# Patient Record
Sex: FEMALE | URBAN - METROPOLITAN AREA
[De-identification: names, ages, dates, MRNs, and addresses within clinical notes are randomized per-mention and may not be internally consistent; named-entity substitution may affect disease eponyms.]

---

## 2020-12-03 ENCOUNTER — APPOINTMENT (RX ONLY)
Dept: URBAN - METROPOLITAN AREA CLINIC 55 | Facility: CLINIC | Age: 30
Setting detail: DERMATOLOGY
End: 2020-12-03

## 2020-12-03 DIAGNOSIS — Z41.9 ENCOUNTER FOR PROCEDURE FOR PURPOSES OTHER THAN REMEDYING HEALTH STATE, UNSPECIFIED: ICD-10-CM

## 2020-12-03 PROCEDURE — ? FRAXEL RESTORE DUAL

## 2020-12-03 ASSESSMENT — LOCATION ZONE DERM
LOCATION ZONE: LIP
LOCATION ZONE: FACE

## 2020-12-03 ASSESSMENT — LOCATION DETAILED DESCRIPTION DERM
LOCATION DETAILED: LEFT INFERIOR CENTRAL MALAR CHEEK
LOCATION DETAILED: SUPERIOR MID FOREHEAD
LOCATION DETAILED: RIGHT CENTRAL MALAR CHEEK
LOCATION DETAILED: PHILTRUM
LOCATION DETAILED: RIGHT CHIN

## 2020-12-03 ASSESSMENT — LOCATION SIMPLE DESCRIPTION DERM
LOCATION SIMPLE: SUPERIOR FOREHEAD
LOCATION SIMPLE: RIGHT CHEEK
LOCATION SIMPLE: LEFT CHEEK
LOCATION SIMPLE: UPPER LIP
LOCATION SIMPLE: CHIN

## 2020-12-03 NOTE — PROCEDURE: FRAXEL RESTORE DUAL
Length Topical Anesthesia Applied (Optional): 60 minutes
Post-Care Instructions: I reviewed with the patient in detail post-care instructions. Samples of BioCream, Journee, and Vaseline sent home with patient.
Detail Level: Zone
Eye Shielding Text (Leave Blank If Unwanted- Will Be Inserted If Selecting Eye Shields): The intraocular eye shields were placed. 2 drops of intraocular tetracaine HCL ophthalmic 0.5% solution was administered. The eye shields were coated with ophthalmic bacitracin prior to insertion. After the shields were removed the eyes were flushed with normal saline.
Passes (Optional): 8
Energy In Mj (Optional): 15
Pain Level (Optional): 1 (mild)
Treatment Number (Optional): 1
Topical Anesthesia?: 20% benzocaine, 8% lidocaine, 4% tetracaine
Actual Kj Used (Optional): 3.11
Consent: Written consent obtained, risks reviewed including but not limited to crusting, scabbing, blistering, scarring, darker or lighter pigmentary change, and/or incomplete removal.
Pre-Procedure Text: Pretreated with HQ 4% QD x4 weeks prior to appt. Valtrex 500 mg #10; started 1 day prior to procedure.
Energy In Mj (Optional): 20
Actual Kj Used (Optional): 0.22
Treatment Level (Optional): 7
Pain Level (Optional): 2 (moderate)

## 2020-12-15 ENCOUNTER — APPOINTMENT (RX ONLY)
Dept: URBAN - METROPOLITAN AREA CLINIC 55 | Facility: CLINIC | Age: 30
Setting detail: DERMATOLOGY
End: 2020-12-15

## 2020-12-15 DIAGNOSIS — Z41.9 ENCOUNTER FOR PROCEDURE FOR PURPOSES OTHER THAN REMEDYING HEALTH STATE, UNSPECIFIED: ICD-10-CM

## 2020-12-15 PROCEDURE — ? COSMETIC FOLLOW-UP

## 2020-12-15 PROCEDURE — ? PHOTO-DOCUMENTATION

## 2020-12-15 NOTE — PROCEDURE: PHOTO-DOCUMENTATION
Detail Level: Zone
Photo Preface (Leave Blank If You Do Not Want): Photographs were obtained today- in RX Photo

## 2020-12-15 NOTE — PROCEDURE: COSMETIC FOLLOW-UP
Comments (Free Text): RX photos obtained today. \\nPatient very pleased with her results. Reduction in pigment and severity of melasma is greatly improved. \\nWill repeat Fraxel 1550 to upper lip only for minor residual melasma.
Side Effects Or Complications: None
Patient Satisfaction: Very pleased
Treatment Override (Free Text): Fraxel
Global Improvement: Very Good
Detail Level: Zone

## 2021-01-22 ENCOUNTER — APPOINTMENT (RX ONLY)
Dept: URBAN - METROPOLITAN AREA CLINIC 55 | Facility: CLINIC | Age: 31
Setting detail: DERMATOLOGY
End: 2021-01-22

## 2021-01-22 DIAGNOSIS — Z41.9 ENCOUNTER FOR PROCEDURE FOR PURPOSES OTHER THAN REMEDYING HEALTH STATE, UNSPECIFIED: ICD-10-CM

## 2021-01-22 PROCEDURE — ? BOTOX

## 2021-01-22 PROCEDURE — ? FRAXEL RESTORE DUAL

## 2021-01-22 ASSESSMENT — LOCATION ZONE DERM
LOCATION ZONE: SCALP
LOCATION ZONE: LIP

## 2021-01-22 ASSESSMENT — LOCATION DETAILED DESCRIPTION DERM
LOCATION DETAILED: PHILTRUM
LOCATION DETAILED: LEFT CENTRAL OCCIPITAL SCALP

## 2021-01-22 ASSESSMENT — LOCATION SIMPLE DESCRIPTION DERM
LOCATION SIMPLE: SCALP
LOCATION SIMPLE: UPPER LIP

## 2021-01-22 NOTE — PROCEDURE: FRAXEL RESTORE DUAL
Pain Level (Optional): 2 (moderate)
Topical Anesthesia?: 20% benzocaine, 8% lidocaine, 4% tetracaine
Post-Care Instructions: I reviewed with the patient in detail post-care instructions. Samples of BioCream, Journee, and Vaseline sent home with patient.
Treatment Level (Optional): 4
Energy In Mj (Optional): 20
Eye Shielding Text (Leave Blank If Unwanted- Will Be Inserted If Selecting Eye Shields): The intraocular eye shields were placed. 2 drops of intraocular tetracaine HCL ophthalmic 0.5% solution was administered. The eye shields were coated with ophthalmic bacitracin prior to insertion. After the shields were removed the eyes were flushed with normal saline.
Pre-Procedure Text: Pretreated with HQ 4% QD x4 weeks prior to appt. Valtrex 500 mg #10; started 1 day prior to procedure.
Length Topical Anesthesia Applied (Optional): 60 minutes
Actual Kj Used (Optional): 0.26
Detail Level: Zone
Treatment Number (Optional): 2
Consent: Written consent obtained, risks reviewed including but not limited to crusting, scabbing, blistering, scarring, darker or lighter pigmentary change, and/or incomplete removal.
Passes (Optional): 8

## 2021-01-22 NOTE — PROCEDURE: BOTOX
Additional Area 5 Units: 0
Show Forehead Units: Yes
Glabellar Complex Units: 6
Dilution (U/0.1 Cc): 4
Additional Area 3 Location: Chin
Additional Area 2 Location: Upper lip
Show Lcl Units: No
Lot #: [id number]
Detail Level: Detailed
Consent: Written consent obtained. Risks include but not limited to lid/brow ptosis, bruising, swelling, diplopia, temporary effect, incomplete chemical denervation.
Expiration Date (Month Year): 9/30/2023
Post-Care Instructions: Patient instructed to not lie down for 4 hours and limit physical activity for 24 hours.
Additional Area 1 Location: Left Lateral Brow

## 2021-05-14 ENCOUNTER — APPOINTMENT (RX ONLY)
Dept: URBAN - METROPOLITAN AREA CLINIC 55 | Facility: CLINIC | Age: 31
Setting detail: DERMATOLOGY
End: 2021-05-14

## 2021-05-14 DIAGNOSIS — Z41.9 ENCOUNTER FOR PROCEDURE FOR PURPOSES OTHER THAN REMEDYING HEALTH STATE, UNSPECIFIED: ICD-10-CM

## 2021-05-14 PROCEDURE — ? HYDRAFACIAL

## 2021-05-14 ASSESSMENT — LOCATION SIMPLE DESCRIPTION DERM: LOCATION SIMPLE: INFERIOR FOREHEAD

## 2021-05-14 ASSESSMENT — LOCATION ZONE DERM: LOCATION ZONE: FACE

## 2021-05-14 ASSESSMENT — LOCATION DETAILED DESCRIPTION DERM: LOCATION DETAILED: INFERIOR MID FOREHEAD

## 2021-05-14 NOTE — PROCEDURE: HYDRAFACIAL
Glycolic Acid %: 7.5%
Vacuum Pressure: 10
Indication: anti-aging
Detail Level: Zone
Comments: Applied SkinBetter compact spf.
Additional Vacuum Pressure (Won't Render If 0): 0
Treatment Number: 2
Number Of Passes: 3
Consent: Written consent obtained, risks reviewed including but not limited to crusting, scabbing, blistering, scarring, darker or lighter pigmentary change, bruising, and/or incomplete response.
Post-Care Instructions: I reviewed with the patient in detail post-care instructions. Patient should stay away from the sun and wear sun protection until treated areas are fully healed. Applied Epidermal repair and Elta MD spf 46 to treated area. Patient given post Hy[employer] handout for a refresher.

## 2022-01-31 ENCOUNTER — RX ONLY (OUTPATIENT)
Age: 32
Setting detail: RX ONLY
End: 2022-01-31

## 2022-02-08 ENCOUNTER — APPOINTMENT (RX ONLY)
Dept: URBAN - METROPOLITAN AREA CLINIC 55 | Facility: CLINIC | Age: 32
Setting detail: DERMATOLOGY
End: 2022-02-08

## 2022-02-08 DIAGNOSIS — Z41.9 ENCOUNTER FOR PROCEDURE FOR PURPOSES OTHER THAN REMEDYING HEALTH STATE, UNSPECIFIED: ICD-10-CM

## 2022-02-08 PROCEDURE — ? MICRONEEDLING

## 2022-02-08 NOTE — PROCEDURE: MICRONEEDLING
Treatment Number (Optional): 1
Post-Care Instructions: After the procedure, take precautions agains sun exposure. Do not apply make-up for 12 hours after the procedure. Avoid alcohol based toners for 10-14 days. After 2-3 days patients can return to their regular skin regimen.
Topical Anesthesia?: 20% benzocaine, 8% lidocaine, 4% tetracaine
Depth In Mm (Location #3): 0.1
Detail Level: Zone
Consent: Verbal consent obtained, risks reviewed including but not limited to pain, scarring, infection and incomplete improvement.  Patient understands the procedure is cosmetic in nature and will require out of pocket payment.

## 2022-03-10 ENCOUNTER — APPOINTMENT (RX ONLY)
Dept: URBAN - METROPOLITAN AREA CLINIC 55 | Facility: CLINIC | Age: 32
Setting detail: DERMATOLOGY
End: 2022-03-10

## 2022-03-10 DIAGNOSIS — Z41.9 ENCOUNTER FOR PROCEDURE FOR PURPOSES OTHER THAN REMEDYING HEALTH STATE, UNSPECIFIED: ICD-10-CM

## 2022-03-10 PROCEDURE — ? MICRONEEDLING

## 2022-03-10 NOTE — PROCEDURE: MICRONEEDLING
Treatment Number (Optional): 2
Detail Level: Zone
Depth In Mm (Location #1): 0.1
Post-Care Instructions: After the procedure, take precautions agains sun exposure. Do not apply make-up for 12 hours after the procedure. Avoid alcohol based toners for 10-14 days. After 2-3 days patients can return to their regular skin regimen.
Topical Anesthesia?: 20% benzocaine, 8% lidocaine, 4% tetracaine
Consent: Verbal consent obtained, risks reviewed including but not limited to pain, scarring, infection and incomplete improvement.  Patient understands the procedure is cosmetic in nature and will require out of pocket payment.

## 2022-04-07 ENCOUNTER — RX ONLY (OUTPATIENT)
Age: 32
Setting detail: RX ONLY
End: 2022-04-07

## 2022-04-07 ENCOUNTER — APPOINTMENT (RX ONLY)
Dept: URBAN - METROPOLITAN AREA CLINIC 55 | Facility: CLINIC | Age: 32
Setting detail: DERMATOLOGY
End: 2022-04-07

## 2022-04-07 DIAGNOSIS — Z41.9 ENCOUNTER FOR PROCEDURE FOR PURPOSES OTHER THAN REMEDYING HEALTH STATE, UNSPECIFIED: ICD-10-CM

## 2022-04-07 PROCEDURE — ? MICRONEEDLING

## 2022-04-07 NOTE — PROCEDURE: MICRONEEDLING
Topical Anesthesia?: 20% benzocaine, 8% lidocaine, 4% tetracaine
Consent: Verbal consent obtained, risks reviewed including but not limited to pain, scarring, infection and incomplete improvement.  Patient understands the procedure is cosmetic in nature and will require out of pocket payment.
Treatment Number (Optional): 3
Depth In Mm (Location #4): 0.1
Post-Care Instructions: After the procedure, take precautions agains sun exposure. Do not apply make-up for 12 hours after the procedure. Avoid alcohol based toners for 10-14 days. After 2-3 days patients can return to their regular skin regimen.
Detail Level: Zone

## 2022-05-05 ENCOUNTER — APPOINTMENT (RX ONLY)
Dept: URBAN - METROPOLITAN AREA CLINIC 55 | Facility: CLINIC | Age: 32
Setting detail: DERMATOLOGY
End: 2022-05-05

## 2022-05-05 DIAGNOSIS — Z41.9 ENCOUNTER FOR PROCEDURE FOR PURPOSES OTHER THAN REMEDYING HEALTH STATE, UNSPECIFIED: ICD-10-CM

## 2022-05-05 PROCEDURE — ? MICRONEEDLING

## 2022-05-05 NOTE — PROCEDURE: MICRONEEDLING
Depth In Mm (Location #2): 0.1
Treatment Number (Optional): 0
Topical Anesthesia?: 20% benzocaine, 8% lidocaine, 4% tetracaine
Consent: Verbal consent obtained, risks reviewed including but not limited to pain, scarring, infection and incomplete improvement.  Patient understands the procedure is cosmetic in nature and will require out of pocket payment.
Post-Care Instructions: After the procedure, take precautions agains sun exposure. Do not apply make-up for 12 hours after the procedure. Avoid alcohol based toners for 10-14 days. After 2-3 days patients can return to their regular skin regimen.
Detail Level: Zone

## 2023-01-07 ENCOUNTER — APPOINTMENT (RX ONLY)
Dept: URBAN - METROPOLITAN AREA CLINIC 55 | Facility: CLINIC | Age: 33
Setting detail: DERMATOLOGY
End: 2023-01-07

## 2023-01-07 DIAGNOSIS — Z41.9 ENCOUNTER FOR PROCEDURE FOR PURPOSES OTHER THAN REMEDYING HEALTH STATE, UNSPECIFIED: ICD-10-CM

## 2023-01-07 PROCEDURE — ? INVENTORY

## 2023-01-07 PROCEDURE — ? BOTOX

## 2023-01-07 NOTE — PROCEDURE: BOTOX
Depressor Anguli Oris Units: 0
Show Right And Left Brow Units: No
Show Additional Area 3: Yes
Consent obtained. Risks include but not limited to lid/brow ptosis, bruising, swelling, diplopia, temporary effect, incomplete chemical denervation.
Detail Level: Detailed
Post-Care Instructions: Patient instructed to not lie down for 4 hours and limit physical activity for 24 hours.
Additional Area 2 Location: Nasalis
Forehead Units: 8
Glabellar Complex Units: 12
Show Inventory Tab: Hide
Additional Area 1 Location: Lip
Dilution (U/0.1 Cc): 4

## 2023-01-09 ENCOUNTER — APPOINTMENT (OUTPATIENT)
Age: 33
Setting detail: DERMATOLOGY
End: 2023-01-09

## 2023-01-09 DIAGNOSIS — L82.1 OTHER SEBORRHEIC KERATOSIS: ICD-10-CM

## 2023-01-09 DIAGNOSIS — L81.1 CHLOASMA: ICD-10-CM

## 2023-01-09 DIAGNOSIS — L81.4 OTHER MELANIN HYPERPIGMENTATION: ICD-10-CM

## 2023-01-09 DIAGNOSIS — D22 MELANOCYTIC NEVI: ICD-10-CM

## 2023-01-09 PROBLEM — D23.71 OTHER BENIGN NEOPLASM OF SKIN OF RIGHT LOWER LIMB, INCLUDING HIP: Status: ACTIVE | Noted: 2023-01-09

## 2023-01-09 PROBLEM — D22.5 MELANOCYTIC NEVI OF TRUNK: Status: ACTIVE | Noted: 2023-01-09

## 2023-01-09 PROCEDURE — 99213 OFFICE O/P EST LOW 20 MIN: CPT

## 2023-01-09 PROCEDURE — ? SUNSCREEN RECOMMENDATIONS

## 2023-01-09 PROCEDURE — ? PRESCRIPTION MEDICATION MANAGEMENT

## 2023-01-09 PROCEDURE — ? COUNSELING

## 2023-01-09 ASSESSMENT — LOCATION DETAILED DESCRIPTION DERM
LOCATION DETAILED: EPIGASTRIC SKIN
LOCATION DETAILED: RIGHT SUPERIOR MEDIAL MIDBACK
LOCATION DETAILED: PHILTRUM
LOCATION DETAILED: INFERIOR THORACIC SPINE

## 2023-01-09 ASSESSMENT — LOCATION ZONE DERM
LOCATION ZONE: TRUNK
LOCATION ZONE: LIP

## 2023-01-09 ASSESSMENT — LOCATION SIMPLE DESCRIPTION DERM
LOCATION SIMPLE: UPPER BACK
LOCATION SIMPLE: ABDOMEN
LOCATION SIMPLE: UPPER LIP
LOCATION SIMPLE: RIGHT LOWER BACK

## 2023-01-09 NOTE — PROCEDURE: PRESCRIPTION MEDICATION MANAGEMENT
Plan: Recommended otc LRP glycolic b5 dark spot corrector.
Render In Strict Bullet Format?: No
Detail Level: Simple

## 2023-05-02 ENCOUNTER — APPOINTMENT (RX ONLY)
Dept: URBAN - METROPOLITAN AREA CLINIC 55 | Facility: CLINIC | Age: 33
Setting detail: DERMATOLOGY
End: 2023-05-02

## 2023-05-02 DIAGNOSIS — Z41.9 ENCOUNTER FOR PROCEDURE FOR PURPOSES OTHER THAN REMEDYING HEALTH STATE, UNSPECIFIED: ICD-10-CM

## 2023-05-02 PROCEDURE — ? COSMETIC CONSULTATION: GENERAL

## 2023-05-02 PROCEDURE — ? DYSPORT

## 2023-05-02 PROCEDURE — ? INVENTORY

## 2023-06-05 ENCOUNTER — APPOINTMENT (OUTPATIENT)
Age: 33
Setting detail: DERMATOLOGY
End: 2023-06-05

## 2023-06-05 DIAGNOSIS — L82.0 INFLAMED SEBORRHEIC KERATOSIS: ICD-10-CM

## 2023-06-05 PROBLEM — D48.5 NEOPLASM OF UNCERTAIN BEHAVIOR OF SKIN: Status: ACTIVE | Noted: 2023-06-05

## 2023-06-05 PROCEDURE — ? COUNSELING

## 2023-06-05 PROCEDURE — ? LIQUID NITROGEN

## 2023-06-05 PROCEDURE — 17110 DESTRUCTION B9 LES UP TO 14: CPT

## 2023-06-05 ASSESSMENT — LOCATION SIMPLE DESCRIPTION DERM: LOCATION SIMPLE: RIGHT CHEEK

## 2023-06-05 ASSESSMENT — LOCATION ZONE DERM: LOCATION ZONE: FACE

## 2023-06-05 ASSESSMENT — LOCATION DETAILED DESCRIPTION DERM: LOCATION DETAILED: RIGHT SUPERIOR MEDIAL BUCCAL CHEEK

## 2023-06-05 NOTE — PROCEDURE: LIQUID NITROGEN
Show Applicator Variable?: Yes
Add 52 Modifier (Optional): no
Detail Level: Detailed
Medical Necessity Information: It is in your best interest to select a reason for this procedure from the list below. All of these items fulfill various CMS LCD requirements except the new and changing color options.
Spray Paint Text: The liquid nitrogen was applied to the skin utilizing a spray paint frosting technique.
Medical Necessity Clause: This procedure was medically necessary because the lesions that were treated were:
Post-Care Instructions: I reviewed with the patient in detail post-care instructions. Patient is to wear sunprotection, and avoid picking at any of the treated lesions. Pt may apply Vaseline to crusted or scabbing areas.
Number Of Freeze-Thaw Cycles: 1 freeze-thaw cycle
Consent: The patient's consent was obtained including but not limited to risks of crusting, scabbing, blistering, scarring, darker or lighter pigmentary change, recurrence, incomplete removal and infection.

## 2023-07-25 ENCOUNTER — APPOINTMENT (RX ONLY)
Dept: URBAN - METROPOLITAN AREA CLINIC 55 | Facility: CLINIC | Age: 33
Setting detail: DERMATOLOGY
End: 2023-07-25

## 2023-07-25 DIAGNOSIS — Z41.9 ENCOUNTER FOR PROCEDURE FOR PURPOSES OTHER THAN REMEDYING HEALTH STATE, UNSPECIFIED: ICD-10-CM

## 2023-07-25 PROCEDURE — ? DYSPORT

## 2023-07-25 PROCEDURE — ? INVENTORY

## 2023-07-25 NOTE — PROCEDURE: DYSPORT
Periorbital Skin Units: 0
Show Forehead Units: Yes
Show Lcl Units: No
Detail Level: Detailed
Dilution (U/0.1 Cc): 10
Consent obtained. Risks include but not limited to lid/brow ptosis, bruising, swelling, diplopia, temporary effect, incomplete chemical denervation.
Forehead Units: 20
Additional Area 1 Location: lip
Glabellar Complex Units: 30
Post-Care Instructions: Patient instructed to not lie down for 4 hours and limit physical activity for 24 hours.
Show Inventory Tab: Show

## 2023-08-02 ENCOUNTER — APPOINTMENT (RX ONLY)
Dept: URBAN - METROPOLITAN AREA CLINIC 55 | Facility: CLINIC | Age: 33
Setting detail: DERMATOLOGY
End: 2023-08-02

## 2023-08-02 DIAGNOSIS — Z41.9 ENCOUNTER FOR PROCEDURE FOR PURPOSES OTHER THAN REMEDYING HEALTH STATE, UNSPECIFIED: ICD-10-CM

## 2023-08-02 PROCEDURE — ? INVENTORY

## 2023-08-02 PROCEDURE — ? FILLERS

## 2023-08-02 NOTE — PROCEDURE: FILLERS
Include Documentation That Aspiration Was Performed Prior To Injecting Filler:: No
Nasolabial Folds Filler Volume In Cc: 0
Aspiration Statement: Aspiration was performed prior to injecting site with filler.
Additional Anesthesia Volume In Cc: 6
Consent: Written consent obtained. Risks include but not limited to bruising, beading, irregular texture, ulceration, infection, allergic reaction, scar formation, incomplete augmentation, temporary nature, and procedural pain.
Include Cannula Information In Note?: Yes
Post-Care Instructions: After the procedure, patient instructed to apply ice to reduce swelling.
Detail Level: Detailed
Include Cannula Size?: 27G
Filler: Restylane Kysse
Anesthesia Type: 1% lidocaine with epinephrine
Map Statment: See Attach Map for Complete Details
Anesthesia Volume In Cc: 0.5

## 2023-10-10 ENCOUNTER — APPOINTMENT (RX ONLY)
Dept: URBAN - METROPOLITAN AREA CLINIC 55 | Facility: CLINIC | Age: 33
Setting detail: DERMATOLOGY
End: 2023-10-10

## 2023-10-10 DIAGNOSIS — Z41.9 ENCOUNTER FOR PROCEDURE FOR PURPOSES OTHER THAN REMEDYING HEALTH STATE, UNSPECIFIED: ICD-10-CM

## 2023-10-10 PROCEDURE — ? INVENTORY

## 2023-10-10 PROCEDURE — ? BOTOX

## 2023-10-10 NOTE — PROCEDURE: BOTOX
Show Additional Area 1: Yes
Right Periorbital Skin Units: 0
Dilution (U/0.1 Cc): 4
Show Right And Left Pupillary Line Units: No
Incrementing Botox Units: By 0.5 Units
Additional Area 3 Location: gummy smile
Detail Level: Detailed
Forehead Units: 8
Show Inventory Tab: Hide
Glabellar Complex Units: 12
Additional Area 1 Location: Lip
Additional Area 2 Location: Nasalis
Consent obtained. Risks include but not limited to lid/brow ptosis, bruising, swelling, diplopia, temporary effect, incomplete chemical denervation.
Post-Care Instructions: Patient instructed to not lie down for 4 hours and limit physical activity for 24 hours.

## 2024-01-10 ENCOUNTER — APPOINTMENT (RX ONLY)
Dept: URBAN - METROPOLITAN AREA CLINIC 55 | Facility: CLINIC | Age: 34
Setting detail: DERMATOLOGY
End: 2024-01-10

## 2024-01-10 DIAGNOSIS — Z41.9 ENCOUNTER FOR PROCEDURE FOR PURPOSES OTHER THAN REMEDYING HEALTH STATE, UNSPECIFIED: ICD-10-CM

## 2024-01-10 PROCEDURE — ? BOTOX

## 2024-01-10 PROCEDURE — ? INVENTORY

## 2024-01-10 PROCEDURE — ? IN-HOUSE DISPENSING PHARMACY

## 2024-01-10 NOTE — PROCEDURE: IN-HOUSE DISPENSING PHARMACY
Product 23 Unit Type: mg
Product 44 Amount/Unit (Numbers Only): 0
Name Of Product 7: Lidocaine 23% / Tetracaine 7% Cream
Product 6 Unit Type: ml
Product 5 Amount/Unit (Numbers Only): 30
Name Of Product 5: Hydrating Tretinoin 0.025% Cream
Product 4 Refills: 2
Name Of Product 6: Rosacea Triple Combination Gel
Product 5 Refills: 11
Product 3 Application Directions: Apply nightly or as directed. Use SPF daily.
Name Of Product 4: Hydroquinone 8% Emulsion
Render Refills If Set To 0: Yes
Detail Level: Zone
Send Charges To Patient Encounter: No
Product 2 Application Directions: Apply nightly or as directed.
Name Of Product 1: Anti-Aging Brightening Cream
Name Of Product 3: Acne Triple Combination Gel
Name Of Product 2: Dapsone / Spironolactone Gel
Product 7 Application Directions: Apply only as directed
Product 7 Units Dispensed: 1

## 2024-01-10 NOTE — PROCEDURE: BOTOX
Show Right And Left Periorbital Units: No
L Brow Units: 0
Show Additional Area 5: Yes
Additional Area 2 Location: Nasalis
Periorbital Skin Units: 12
Dilution (U/0.1 Cc): 4
Show Inventory Tab: Hide
Consent obtained. Risks include but not limited to lid/brow ptosis, bruising, swelling, diplopia, temporary effect, incomplete chemical denervation.
Incrementing Botox Units: By 0.5 Units
Additional Area 1 Location: Lip
Post-Care Instructions: Patient instructed to not lie down for 4 hours and limit physical activity for 24 hours.
Forehead Units: 8
Glabellar Complex Units: 16
Additional Area 3 Location: gummy smile
Detail Level: Detailed

## 2024-04-24 ENCOUNTER — APPOINTMENT (OUTPATIENT)
Age: 34
Setting detail: DERMATOLOGY
End: 2024-04-24

## 2024-04-24 DIAGNOSIS — L81.4 OTHER MELANIN HYPERPIGMENTATION: ICD-10-CM

## 2024-04-24 DIAGNOSIS — H02.82 CYSTS OF EYELID: ICD-10-CM

## 2024-04-24 DIAGNOSIS — D22 MELANOCYTIC NEVI: ICD-10-CM

## 2024-04-24 DIAGNOSIS — L82.1 OTHER SEBORRHEIC KERATOSIS: ICD-10-CM

## 2024-04-24 PROBLEM — D23.71 OTHER BENIGN NEOPLASM OF SKIN OF RIGHT LOWER LIMB, INCLUDING HIP: Status: ACTIVE | Noted: 2024-04-24

## 2024-04-24 PROBLEM — D22.5 MELANOCYTIC NEVI OF TRUNK: Status: ACTIVE | Noted: 2024-04-24

## 2024-04-24 PROBLEM — D48.5 NEOPLASM OF UNCERTAIN BEHAVIOR OF SKIN: Status: ACTIVE | Noted: 2024-04-24

## 2024-04-24 PROBLEM — H02.829 CYSTS OF UNSPECIFIED EYE, UNSPECIFIED EYELID: Status: ACTIVE | Noted: 2024-04-24

## 2024-04-24 PROCEDURE — ? COSMETIC EXTRACTIONS

## 2024-04-24 PROCEDURE — ? SHAVE REMOVAL

## 2024-04-24 PROCEDURE — 11300 SHAVE SKIN LESION 0.5 CM/<: CPT

## 2024-04-24 PROCEDURE — ? SUNSCREEN RECOMMENDATIONS

## 2024-04-24 PROCEDURE — 99213 OFFICE O/P EST LOW 20 MIN: CPT | Mod: 25

## 2024-04-24 PROCEDURE — ? COUNSELING

## 2024-04-24 ASSESSMENT — LOCATION DETAILED DESCRIPTION DERM
LOCATION DETAILED: INFERIOR THORACIC SPINE
LOCATION DETAILED: LEFT CENTRAL EYEBROW
LOCATION DETAILED: EPIGASTRIC SKIN
LOCATION DETAILED: RIGHT AXILLARY VAULT
LOCATION DETAILED: RIGHT SUPERIOR MEDIAL MIDBACK

## 2024-04-24 ASSESSMENT — LOCATION SIMPLE DESCRIPTION DERM
LOCATION SIMPLE: ABDOMEN
LOCATION SIMPLE: RIGHT LOWER BACK
LOCATION SIMPLE: RIGHT AXILLARY VAULT
LOCATION SIMPLE: UPPER BACK
LOCATION SIMPLE: LEFT EYEBROW

## 2024-04-24 ASSESSMENT — LOCATION ZONE DERM
LOCATION ZONE: TRUNK
LOCATION ZONE: FACE
LOCATION ZONE: AXILLAE

## 2024-04-24 NOTE — HPI: EVALUATION OF SKIN LESION(S)
Hpi Title: Evaluation of Skin Lesions
Have Your Spot(S) Been Treated In The Past?: has not been treated
Family Member: Father & grandmother
Additional History: Patient here for full skin check, reports one mole in her right armpit that she thinks may be getting bigger and gets irritated when she shaves.

## 2024-04-24 NOTE — PROCEDURE: SHAVE REMOVAL
Size Of Lesion In Cm (Required): 0.5
Render Path Notes In Note?: No
Anesthesia Type: 1% lidocaine with epinephrine and a 1:10 solution of 8.4% sodium bicarbonate
Depth Of Shave: dermis
Was A Bandage Applied: Yes
Hemostasis: Drysol
Anesthesia Volume In Cc: 0.7
X Size Of Lesion In Cm (Optional): 0
Notification Instructions: Patient will be notified of pathology results. However, patient instructed to call the office if not contacted within 2 weeks.
Billing Type: Third-Party Bill
Detail Level: Detailed
Biopsy Method: double edge Personna blade
Consent was obtained from the patient. The risks and benefits to therapy were discussed in detail. Specifically, the risks of infection, scarring, bleeding, prolonged wound healing, incomplete removal, allergy to anesthesia, nerve injury and recurrence were addressed. Prior to the procedure, the treatment site was clearly identified and confirmed by the patient. All components of Universal Protocol/PAUSE Rule completed.
Post-Care Instructions: I reviewed with the patient in detail post-care instructions. Patient is to keep the biopsy site dry overnight, and then apply bacitracin twice daily until healed. Patient may apply hydrogen peroxide soaks to remove any crusting.
Wound Care: Mupirocin
Medical Necessity Information: It is in your best interest to select a reason for this procedure from the list below. All of these items fulfill various CMS LCD requirements except the new and changing color options.
Medical Necessity Clause: This procedure was medically necessary because the lesion that was treated was:

## 2024-04-24 NOTE — PROCEDURE: COSMETIC EXTRACTIONS
Detail Level: Detailed
Anesthesia Volume In Cc: 0
Price (Use Numbers Only, No Special Characters Or $): 25
Post-Care Instructions: I reviewed with the patient in detail post-care instructions. Patient is to wear sunprotection, and avoid picking at any of the treated lesions. Pt may apply Vaseline to crusted or scabbing areas.
Consent: The patient's consent was obtained including but not limited to risks of crusting, scabbing, blistering, scarring, darker or lighter pigmentary change, recurrence, incomplete removal and infection.
Render The Number Of Extractions: Yes

## 2024-06-25 ENCOUNTER — APPOINTMENT (RX ONLY)
Dept: URBAN - METROPOLITAN AREA CLINIC 55 | Facility: CLINIC | Age: 34
Setting detail: DERMATOLOGY
End: 2024-06-25

## 2024-06-25 DIAGNOSIS — Z41.9 ENCOUNTER FOR PROCEDURE FOR PURPOSES OTHER THAN REMEDYING HEALTH STATE, UNSPECIFIED: ICD-10-CM

## 2024-06-25 PROCEDURE — ? INVENTORY

## 2024-06-25 PROCEDURE — ? BOTOX

## 2024-06-25 PROCEDURE — ? COSMETIC CONSULTATION: GENERAL

## 2024-06-25 NOTE — PROCEDURE: BOTOX
R Brow Units: 0
Show Additional Area 4: Yes
Show Lcl Units: No
Additional Area 2 Location: Nasalis
Additional Area 1 Location: Lip
Incrementing Botox Units: By 0.5 Units
Detail Level: Detailed
Consent obtained. Risks include but not limited to lid/brow ptosis, bruising, swelling, diplopia, temporary effect, incomplete chemical denervation.
Forehead Units: 8
Show Inventory Tab: Hide
Dilution (U/0.1 Cc): 4
Additional Area 3 Location: gummy smile
Glabellar Complex Units: 16
Post-Care Instructions: Patient instructed to not lie down for 4 hours and limit physical activity for 24 hours.

## 2024-07-17 ENCOUNTER — APPOINTMENT (RX ONLY)
Dept: URBAN - METROPOLITAN AREA CLINIC 55 | Facility: CLINIC | Age: 34
Setting detail: DERMATOLOGY
End: 2024-07-17

## 2024-07-17 DIAGNOSIS — Z41.9 ENCOUNTER FOR PROCEDURE FOR PURPOSES OTHER THAN REMEDYING HEALTH STATE, UNSPECIFIED: ICD-10-CM

## 2024-07-17 PROCEDURE — ? INVENTORY

## 2024-07-17 PROCEDURE — ? PALOMAR VECTUS LASER HAIR REMOVAL

## 2024-07-17 NOTE — PROCEDURE: PALOMAR VECTUS LASER HAIR REMOVAL
# Of Treatments In Package: 0
Post-Care Instructions: I reviewed with the patient in detail post-care instructions. Patient should avoid the sun before and after treatment.
Post-Procedure Care: Immediate endpoint: perifollicular erythema and edema. Post care was reviewed with the patient and all patient questions were answered to their satisfaction.
Rate (Hz): Medium
Optic Size: 23 x 38mm
Render Post-Care In The Note: No
Pre-Procedure: Prior to proceeding the treatment areas were cleaned and all present put on their eye protection.
Laser Type: diode 810nm
Consent obtained, risks reviewed.
Detail Level: Detailed
Fluence In J/Cm2: 26/

## 2024-08-21 ENCOUNTER — APPOINTMENT (RX ONLY)
Dept: URBAN - METROPOLITAN AREA CLINIC 55 | Facility: CLINIC | Age: 34
Setting detail: DERMATOLOGY
End: 2024-08-21

## 2024-08-21 DIAGNOSIS — Z41.9 ENCOUNTER FOR PROCEDURE FOR PURPOSES OTHER THAN REMEDYING HEALTH STATE, UNSPECIFIED: ICD-10-CM

## 2024-08-21 PROCEDURE — ? INVENTORY

## 2024-08-21 PROCEDURE — ? PALOMAR VECTUS LASER HAIR REMOVAL

## 2024-08-21 NOTE — PROCEDURE: PALOMAR VECTUS LASER HAIR REMOVAL
Render Post-Care In The Note: No
Post-Procedure Care: Immediate endpoint: perifollicular erythema and edema. Post care was reviewed with the patient and all patient questions were answered to their satisfaction.
Laser Type: diode 810nm
Treatment Number: 0
Fluence In J/Cm2: 20/30
Rate (Hz): Medium
Consent obtained, risks reviewed.
Post-Care Instructions: I reviewed with the patient in detail post-care instructions. Patient should avoid the sun before and after treatment.
Detail Level: Detailed
Pre-Procedure: Prior to proceeding the treatment areas were cleaned and all present put on their eye protection.
Optic Size: 12 x 12mm

## 2024-10-02 ENCOUNTER — APPOINTMENT (RX ONLY)
Dept: URBAN - METROPOLITAN AREA CLINIC 55 | Facility: CLINIC | Age: 34
Setting detail: DERMATOLOGY
End: 2024-10-02

## 2024-10-02 DIAGNOSIS — Z41.9 ENCOUNTER FOR PROCEDURE FOR PURPOSES OTHER THAN REMEDYING HEALTH STATE, UNSPECIFIED: ICD-10-CM

## 2024-10-02 PROCEDURE — ? INVENTORY

## 2024-10-02 PROCEDURE — ? PALOMAR VECTUS LASER HAIR REMOVAL

## 2024-10-02 NOTE — PROCEDURE: PALOMAR VECTUS LASER HAIR REMOVAL
Rate (Hz): Medium
Post-Procedure Care: Immediate endpoint: perifollicular erythema and edema. Post care was reviewed with the patient and all patient questions were answered to their satisfaction.
Post-Care Instructions: I reviewed with the patient in detail post-care instructions. Patient should avoid the sun before and after treatment.
Laser Type: diode 810nm
Pre-Procedure: Prior to proceeding the treatment areas were cleaned and all present put on their eye protection.
Treatment Number: 0
Detail Level: Detailed
Fluence In J/Cm2: 14/22
Consent obtained, risks reviewed.
Optic Size: 12 x 12mm
Render Post-Care In The Note: No

## 2024-10-04 ENCOUNTER — APPOINTMENT (RX ONLY)
Dept: URBAN - METROPOLITAN AREA CLINIC 55 | Facility: CLINIC | Age: 34
Setting detail: DERMATOLOGY
End: 2024-10-04

## 2024-10-04 DIAGNOSIS — Z41.9 ENCOUNTER FOR PROCEDURE FOR PURPOSES OTHER THAN REMEDYING HEALTH STATE, UNSPECIFIED: ICD-10-CM

## 2024-10-04 PROCEDURE — ? BOTOX

## 2024-10-04 PROCEDURE — ? INVENTORY

## 2024-10-04 NOTE — PROCEDURE: BOTOX
Anterior Platysmal Bands Units: 0
Show Masseter Units: Yes
Show Ucl Units: No
Glabellar Complex Units: 16
Post-Care Instructions: Patient instructed to not lie down for 4 hours and limit physical activity for 24 hours.
Forehead Units: 8
Show Inventory Tab: Hide
Consent obtained. Risks include but not limited to lid/brow ptosis, bruising, swelling, diplopia, temporary effect, incomplete chemical denervation.
Additional Area 1 Location: Lip
Incrementing Botox Units: By 0.5 Units
Dilution (U/0.1 Cc): 4
Additional Area 2 Location: Nasalis
Detail Level: Detailed
Additional Area 3 Location: gummy smile

## 2024-11-13 ENCOUNTER — APPOINTMENT (RX ONLY)
Dept: URBAN - METROPOLITAN AREA CLINIC 55 | Facility: CLINIC | Age: 34
Setting detail: DERMATOLOGY
End: 2024-11-13

## 2024-11-13 DIAGNOSIS — Z41.9 ENCOUNTER FOR PROCEDURE FOR PURPOSES OTHER THAN REMEDYING HEALTH STATE, UNSPECIFIED: ICD-10-CM

## 2024-11-13 PROCEDURE — ? PALOMAR VECTUS LASER HAIR REMOVAL

## 2024-11-13 PROCEDURE — ? INVENTORY

## 2024-11-13 NOTE — PROCEDURE: PALOMAR VECTUS LASER HAIR REMOVAL
Post-Care Instructions: I reviewed with the patient in detail post-care instructions. Patient should avoid the sun before and after treatment.
Post-Procedure Care: Immediate endpoint: perifollicular erythema and edema. Post care was reviewed with the patient and all patient questions were answered to their satisfaction.
Optic Size: 12 x 12mm
Render Post-Care In The Note: No
Treatment Number: 0
Consent obtained, risks reviewed.
Laser Type: diode 810nm
Fluence In J/Cm2: 14/22
Detail Level: Detailed
Pre-Procedure: Prior to proceeding the treatment areas were cleaned and all present put on their eye protection.
Rate (Hz): Medium

## 2025-01-02 ENCOUNTER — APPOINTMENT (OUTPATIENT)
Dept: URBAN - METROPOLITAN AREA CLINIC 55 | Facility: CLINIC | Age: 35
Setting detail: DERMATOLOGY
End: 2025-01-02

## 2025-01-02 DIAGNOSIS — Z41.9 ENCOUNTER FOR PROCEDURE FOR PURPOSES OTHER THAN REMEDYING HEALTH STATE, UNSPECIFIED: ICD-10-CM

## 2025-01-02 PROCEDURE — ? INVENTORY

## 2025-01-02 PROCEDURE — ? PALOMAR VECTUS LASER HAIR REMOVAL

## 2025-01-02 NOTE — PROCEDURE: PALOMAR VECTUS LASER HAIR REMOVAL
Fluence In J/Cm2: 14/22
Post-Care Instructions: I reviewed with the patient in detail post-care instructions. Patient should avoid the sun before and after treatment.
Detail Level: Detailed
Optic Size: 12 x 12mm
External Cooling Fan Speed: 0
Pre-Procedure: Prior to proceeding the treatment areas were cleaned and all present put on their eye protection.
Consent obtained, risks reviewed.
Rate (Hz): Medium
Laser Type: diode 810nm
Render Post-Care In The Note: No
Post-Procedure Care: Immediate endpoint: perifollicular erythema and edema. Post care was reviewed with the patient and all patient questions were answered to their satisfaction.

## 2025-01-07 ENCOUNTER — APPOINTMENT (OUTPATIENT)
Dept: URBAN - METROPOLITAN AREA CLINIC 55 | Facility: CLINIC | Age: 35
Setting detail: DERMATOLOGY
End: 2025-01-07

## 2025-01-07 DIAGNOSIS — Z41.9 ENCOUNTER FOR PROCEDURE FOR PURPOSES OTHER THAN REMEDYING HEALTH STATE, UNSPECIFIED: ICD-10-CM

## 2025-01-07 PROCEDURE — ? BOTOX

## 2025-01-07 PROCEDURE — ? FILLERS

## 2025-01-07 PROCEDURE — ? INVENTORY

## 2025-01-07 NOTE — PROCEDURE: BOTOX
Additional Area 3 Units: 0
Show Lcl Units: No
Detail Level: Detailed
Show Additional Area 2: Yes
Additional Area 1 Location: Lip
Periorbital Skin Units: 12
Incrementing Botox Units: By 0.5 Units
Dilution (U/0.1 Cc): 4
Consent obtained. Risks include but not limited to lid/brow ptosis, bruising, swelling, diplopia, temporary effect, incomplete chemical denervation.
Show Inventory Tab: Hide
Post-Care Instructions: Patient instructed to not lie down for 4 hours and limit physical activity for 24 hours.
Additional Area 3 Location: gummy smile
Forehead Units: 8
Additional Area 2 Location: Nasalis
Glabellar Complex Units: 16

## 2025-01-07 NOTE — PROCEDURE: FILLERS
Vermilion Lips Filler Volume In Cc: 0
Map Statment: See Attach Map for Complete Details
Include Cannula Information In Note?: Yes
Filler: Restylane Kysse
Include Documentation That Aspiration Was Performed Prior To Injecting Filler:: No
Aspiration Statement: Aspiration was performed prior to injecting site with filler.
Anesthesia Type: 1% lidocaine with epinephrine
Include Cannula Size?: 27G
Anesthesia Volume In Cc: 0.5
Consent: Written consent obtained. Risks include but not limited to bruising, beading, irregular texture, ulceration, infection, allergic reaction, scar formation, incomplete augmentation, temporary nature, and procedural pain.
Detail Level: Detailed
Additional Anesthesia Volume In Cc: 6
Post-Care Instructions: After the procedure, patient instructed to apply ice to reduce swelling.
Include Cannula Size?: 25G

## 2025-01-16 ENCOUNTER — APPOINTMENT (OUTPATIENT)
Dept: URBAN - METROPOLITAN AREA CLINIC 55 | Facility: CLINIC | Age: 35
Setting detail: DERMATOLOGY
End: 2025-01-16

## 2025-03-04 ENCOUNTER — APPOINTMENT (OUTPATIENT)
Dept: URBAN - METROPOLITAN AREA CLINIC 55 | Facility: CLINIC | Age: 35
Setting detail: DERMATOLOGY
End: 2025-03-04

## 2025-03-04 DIAGNOSIS — Z41.9 ENCOUNTER FOR PROCEDURE FOR PURPOSES OTHER THAN REMEDYING HEALTH STATE, UNSPECIFIED: ICD-10-CM

## 2025-03-04 PROCEDURE — ? INVENTORY

## 2025-03-04 PROCEDURE — ? PALOMAR VECTUS LASER HAIR REMOVAL

## 2025-03-04 NOTE — PROCEDURE: PALOMAR VECTUS LASER HAIR REMOVAL
Post-Procedure Care: Immediate endpoint: perifollicular erythema and edema. Post care was reviewed with the patient and all patient questions were answered to their satisfaction.
External Cooling Fan Speed: 0
Render Post-Care In The Note: No
Consent obtained, risks reviewed.
Fluence In J/Cm2: 17/26
Rate (Hz): Medium
Pre-Procedure: Prior to proceeding the treatment areas were cleaned and all present put on their eye protection.
Optic Size: 12 x 12mm
Post-Care Instructions: I reviewed with the patient in detail post-care instructions. Patient should avoid the sun before and after treatment.
Laser Type: diode 810nm
Detail Level: Detailed

## 2025-03-13 ENCOUNTER — APPOINTMENT (OUTPATIENT)
Dept: URBAN - METROPOLITAN AREA CLINIC 55 | Facility: CLINIC | Age: 35
Setting detail: DERMATOLOGY
End: 2025-03-13

## 2025-03-13 DIAGNOSIS — Z41.9 ENCOUNTER FOR PROCEDURE FOR PURPOSES OTHER THAN REMEDYING HEALTH STATE, UNSPECIFIED: ICD-10-CM

## 2025-03-13 PROCEDURE — ? INVENTORY

## 2025-03-13 PROCEDURE — ? SIGNATURE HYDRAFACIAL

## 2025-03-13 ASSESSMENT — LOCATION ZONE DERM: LOCATION ZONE: FACE

## 2025-03-13 ASSESSMENT — LOCATION DETAILED DESCRIPTION DERM: LOCATION DETAILED: GLABELLA

## 2025-03-13 ASSESSMENT — LOCATION SIMPLE DESCRIPTION DERM: LOCATION SIMPLE: GLABELLA

## 2025-03-13 NOTE — PROCEDURE: SIGNATURE HYDRAFACIAL
Vacuum Pressure Low Setting (Will Not Render If Set To 0): 0
Solution: Beta-HD
Solution: Activ-4
Tip: Hydropeel Tip, Teal
Procedure: Boost
Consent: Written consent obtained, risks reviewed including but not limited to crusting, scabbing, blistering, scarring, darker or lighter pigmentary change, bruising, and/or incomplete response.
Treatment Number: 1
Solution Override
Tip Override
Procedure: Peel
Tip: Hydropeel Tip, Blue
Indication: anti-aging
Procedure: Extend and Protect
Tip: Hydropeel Tip, Clear
Post-Care Instructions: I reviewed with the patient in detail post-care instructions. Patient should stay away from the sun and wear sun protection until treated areas are fully healed.
Solution: GlySal 15%
Procedure: Exfoliation
Procedure: Fusion
Location: face
Procedure: Extraction
Solution: Antiox-6

## 2025-04-22 ENCOUNTER — APPOINTMENT (OUTPATIENT)
Dept: URBAN - METROPOLITAN AREA CLINIC 55 | Facility: CLINIC | Age: 35
Setting detail: DERMATOLOGY
End: 2025-04-22

## 2025-04-22 DIAGNOSIS — Z41.9 ENCOUNTER FOR PROCEDURE FOR PURPOSES OTHER THAN REMEDYING HEALTH STATE, UNSPECIFIED: ICD-10-CM

## 2025-04-22 PROCEDURE — ? BOTOX

## 2025-04-22 PROCEDURE — ? INVENTORY

## 2025-05-29 ENCOUNTER — APPOINTMENT (OUTPATIENT)
Dept: URBAN - METROPOLITAN AREA CLINIC 55 | Facility: CLINIC | Age: 35
Setting detail: DERMATOLOGY
End: 2025-05-29

## 2025-05-29 DIAGNOSIS — Z41.9 ENCOUNTER FOR PROCEDURE FOR PURPOSES OTHER THAN REMEDYING HEALTH STATE, UNSPECIFIED: ICD-10-CM

## 2025-05-29 PROCEDURE — ? MICRONEEDLING

## 2025-05-29 PROCEDURE — ? INVENTORY

## 2025-05-29 NOTE — PROCEDURE: MICRONEEDLING
Depth In Mm (Location #1): 0.1
How Many Cc Of Bacteriostatic 0.9% Saline Were Added?: 0
Detail Level: Zone
Consent obtained, risks reviewed.
Topical Anesthesia?: 20% benzocaine, 8% lidocaine, 4% tetracaine
Post-Care Instructions: Alastin Skin Nectar and SPF applied immediately post procedure. \\nAfter care instruction provided to patient.

## 2025-06-24 ENCOUNTER — APPOINTMENT (OUTPATIENT)
Age: 35
Setting detail: DERMATOLOGY
End: 2025-06-24

## 2025-06-24 DIAGNOSIS — L81.4 OTHER MELANIN HYPERPIGMENTATION: ICD-10-CM

## 2025-06-24 DIAGNOSIS — L82.1 OTHER SEBORRHEIC KERATOSIS: ICD-10-CM

## 2025-06-24 DIAGNOSIS — D22 MELANOCYTIC NEVI: ICD-10-CM

## 2025-06-24 PROBLEM — D22.72 MELANOCYTIC NEVI OF LEFT LOWER LIMB, INCLUDING HIP: Status: ACTIVE | Noted: 2025-06-24

## 2025-06-24 PROBLEM — D22.62 MELANOCYTIC NEVI OF LEFT UPPER LIMB, INCLUDING SHOULDER: Status: ACTIVE | Noted: 2025-06-24

## 2025-06-24 PROBLEM — D22.71 MELANOCYTIC NEVI OF RIGHT LOWER LIMB, INCLUDING HIP: Status: ACTIVE | Noted: 2025-06-24

## 2025-06-24 PROBLEM — D23.71 OTHER BENIGN NEOPLASM OF SKIN OF RIGHT LOWER LIMB, INCLUDING HIP: Status: ACTIVE | Noted: 2025-06-24

## 2025-06-24 PROBLEM — D22.61 MELANOCYTIC NEVI OF RIGHT UPPER LIMB, INCLUDING SHOULDER: Status: ACTIVE | Noted: 2025-06-24

## 2025-06-24 PROBLEM — D22.5 MELANOCYTIC NEVI OF TRUNK: Status: ACTIVE | Noted: 2025-06-24

## 2025-06-24 PROCEDURE — ? COUNSELING

## 2025-06-24 PROCEDURE — ? SUNSCREEN RECOMMENDATIONS

## 2025-06-24 ASSESSMENT — LOCATION SIMPLE DESCRIPTION DERM
LOCATION SIMPLE: LEFT POSTERIOR UPPER ARM
LOCATION SIMPLE: ABDOMEN
LOCATION SIMPLE: RIGHT LOWER BACK
LOCATION SIMPLE: RIGHT POSTERIOR THIGH
LOCATION SIMPLE: LEFT POSTERIOR THIGH
LOCATION SIMPLE: RIGHT POSTERIOR UPPER ARM
LOCATION SIMPLE: UPPER BACK

## 2025-06-24 ASSESSMENT — LOCATION ZONE DERM
LOCATION ZONE: TRUNK
LOCATION ZONE: LEG
LOCATION ZONE: ARM

## 2025-06-24 ASSESSMENT — LOCATION DETAILED DESCRIPTION DERM
LOCATION DETAILED: EPIGASTRIC SKIN
LOCATION DETAILED: LEFT DISTAL POSTERIOR THIGH
LOCATION DETAILED: RIGHT DISTAL POSTERIOR THIGH
LOCATION DETAILED: LEFT PROXIMAL POSTERIOR UPPER ARM
LOCATION DETAILED: RIGHT PROXIMAL POSTERIOR UPPER ARM
LOCATION DETAILED: RIGHT SUPERIOR MEDIAL MIDBACK
LOCATION DETAILED: INFERIOR THORACIC SPINE

## 2025-06-24 NOTE — HPI: EVALUATION OF SKIN LESION(S)
Hpi Title: Evaluation of Skin Lesions
Have Your Spot(S) Been Treated In The Past?: has not been treated
Family Member: Father & grandmother
Additional History: The patient requests that all their moles, brown spots, and bumps be evaluated across their entire body to make sure they are not dangerous.

## 2025-07-01 ENCOUNTER — APPOINTMENT (OUTPATIENT)
Dept: URBAN - METROPOLITAN AREA CLINIC 55 | Facility: CLINIC | Age: 35
Setting detail: DERMATOLOGY
End: 2025-07-01

## 2025-07-01 DIAGNOSIS — Z41.9 ENCOUNTER FOR PROCEDURE FOR PURPOSES OTHER THAN REMEDYING HEALTH STATE, UNSPECIFIED: ICD-10-CM

## 2025-07-01 PROCEDURE — ? MICRONEEDLING

## 2025-07-01 PROCEDURE — ? COSMETIC FOLLOW-UP

## 2025-07-01 PROCEDURE — ? INVENTORY

## 2025-07-01 PROCEDURE — ? IN-HOUSE DISPENSING PHARMACY

## 2025-07-01 NOTE — PROCEDURE: IN-HOUSE DISPENSING PHARMACY
Name Of Product 2: Dapsone / Spironolactone Gel
Product 17 Refills: 0
Product 38 Unit Type: mg
Product 5 Unit Type: ml
Product 2 Amount/Unit (Numbers Only): 30
Name Of Product 3: Acne Triple Combination Gel
Product 4 Refills: 2
Product 5 Refills: 11
Name Of Product 4: Hydroquinone 8% Emulsion
Product 3 Application Directions: Apply nightly or as directed. Use SPF daily.
Send Charges To Patient Encounter: No
Name Of Product 5: Hydrating Tretinoin 0.025% Cream
Render Product Pricing In Note: Yes
Name Of Product 7: Lidocaine 23% / Tetracaine 7% Cream
Name Of Product 1: Anti-Aging Brightening Cream
Name Of Product 6: Rosacea Triple Combination Gel
Product 7 Application Directions: Apply only as directed
Detail Level: Zone
Product 4 Application Directions: Apply nightly or as directed.

## 2025-07-01 NOTE — PROCEDURE: MICRONEEDLING
Depth In Mm (Location #2): 0.1
Consent obtained, risks reviewed.
Treatment Number (Optional): 0
Detail Level: Zone
Topical Anesthesia?: 20% benzocaine, 8% lidocaine, 4% tetracaine
Post-Care Instructions: Alastin Skin Nectar and SPF applied immediately post procedure. \\nAfter care instruction provided to patient.

## 2025-07-24 ENCOUNTER — APPOINTMENT (OUTPATIENT)
Dept: URBAN - METROPOLITAN AREA CLINIC 55 | Facility: CLINIC | Age: 35
Setting detail: DERMATOLOGY
End: 2025-07-24

## 2025-07-24 DIAGNOSIS — Z41.9 ENCOUNTER FOR PROCEDURE FOR PURPOSES OTHER THAN REMEDYING HEALTH STATE, UNSPECIFIED: ICD-10-CM

## 2025-07-24 PROCEDURE — ? INVENTORY

## 2025-07-24 PROCEDURE — ? SIGNATURE HYDRAFACIAL

## 2025-07-24 ASSESSMENT — LOCATION ZONE DERM: LOCATION ZONE: FACE

## 2025-07-24 ASSESSMENT — LOCATION DETAILED DESCRIPTION DERM: LOCATION DETAILED: GLABELLA

## 2025-07-24 ASSESSMENT — LOCATION SIMPLE DESCRIPTION DERM: LOCATION SIMPLE: GLABELLA

## 2025-07-24 NOTE — PROCEDURE: SIGNATURE HYDRAFACIAL
Procedure: Extend and Protect
Vacuum Pressure High Setting (Will Not Render If Set To 0): 0
Solution: Activ-4
Solution: Antiox-6
Procedure: Peel
Solution Override
Tip: Hydropeel Tip, Teal
Treatment Number: 1
Solution: GlySal 15%
Procedure: Extraction
Tip: Hydropeel Tip, Blue
Indication: anti-aging
Tip: Hydropeel Tip, Clear
Procedure: Boost
Solution: Beta-HD
Procedure: Exfoliation
Consent: Written consent obtained, risks reviewed including but not limited to crusting, scabbing, blistering, scarring, darker or lighter pigmentary change, bruising, and/or incomplete response.
Procedure: Fusion
Post-Care Instructions: I reviewed with the patient in detail post-care instructions. Patient should stay away from the sun and wear sun protection until treated areas are fully healed.
Location: face
Tip Override

## 2025-08-01 ENCOUNTER — APPOINTMENT (OUTPATIENT)
Dept: URBAN - METROPOLITAN AREA CLINIC 55 | Facility: CLINIC | Age: 35
Setting detail: DERMATOLOGY
End: 2025-08-01

## 2025-08-01 DIAGNOSIS — Z41.9 ENCOUNTER FOR PROCEDURE FOR PURPOSES OTHER THAN REMEDYING HEALTH STATE, UNSPECIFIED: ICD-10-CM

## 2025-08-01 PROCEDURE — ? LUTRONIC CLARITY II

## 2025-08-01 PROCEDURE — ? INVENTORY

## 2025-08-01 NOTE — PROCEDURE: LUTRONIC CLARITY II
Laser Type: 755nm
Add Another Setting?: no
Pulse Width (Ms): 3
Treatment Number: 0
Spot Size: 15 mm
Fluence (J/Cm2): 14
Procedure Note: Treatment was administered using the setting parameters listed above.
Anesthesia Type: 1% lidocaine with epinephrine
Consent: Written consent obtained.  Risks reviewed including but not limited to erythema, swelling, crusting, scabbing, blistering, scarring, and darker or lighter pigmentary change.  Patient understands that results are not guaranteed and multiple treatments may be needed to achieve desired result.
Detail Level: Simple
Post-Care Instructions: I reviewed with the patient in detail post-care instructions. Patient should avoid sun exposure before and after treatment.  Diligent sunscreen use and sun avoidance advised.
Fluence (J/Cm2): 16
Spot Size: 18 mm

## 2025-08-18 ENCOUNTER — APPOINTMENT (OUTPATIENT)
Dept: URBAN - METROPOLITAN AREA CLINIC 55 | Facility: CLINIC | Age: 35
Setting detail: DERMATOLOGY
End: 2025-08-18

## 2025-08-18 DIAGNOSIS — Z41.9 ENCOUNTER FOR PROCEDURE FOR PURPOSES OTHER THAN REMEDYING HEALTH STATE, UNSPECIFIED: ICD-10-CM

## 2025-08-18 PROCEDURE — ? INVENTORY

## 2025-08-18 PROCEDURE — ? BOTOX

## 2025-08-29 ENCOUNTER — APPOINTMENT (OUTPATIENT)
Dept: URBAN - METROPOLITAN AREA CLINIC 55 | Facility: CLINIC | Age: 35
Setting detail: DERMATOLOGY
End: 2025-08-29

## 2025-08-29 DIAGNOSIS — Z41.9 ENCOUNTER FOR PROCEDURE FOR PURPOSES OTHER THAN REMEDYING HEALTH STATE, UNSPECIFIED: ICD-10-CM

## 2025-08-29 PROCEDURE — ? INVENTORY

## 2025-08-29 PROCEDURE — ? MICRONEEDLING
